# Patient Record
Sex: FEMALE | Race: BLACK OR AFRICAN AMERICAN | NOT HISPANIC OR LATINO | URBAN - NONMETROPOLITAN AREA
[De-identification: names, ages, dates, MRNs, and addresses within clinical notes are randomized per-mention and may not be internally consistent; named-entity substitution may affect disease eponyms.]

---

## 2017-10-18 ENCOUNTER — IMPORTED ENCOUNTER (OUTPATIENT)
Dept: URBAN - NONMETROPOLITAN AREA CLINIC 1 | Facility: CLINIC | Age: 32
End: 2017-10-18

## 2017-10-18 PROBLEM — H52.223: Noted: 2017-10-18

## 2017-10-18 PROBLEM — H52.13: Noted: 2017-10-18

## 2017-10-18 PROBLEM — H10.423: Noted: 2017-10-18

## 2017-10-18 PROBLEM — H40.023: Noted: 2017-10-18

## 2017-10-18 PROBLEM — H04.123: Noted: 2017-10-18

## 2017-10-18 PROCEDURE — 99204 OFFICE O/P NEW MOD 45 MIN: CPT

## 2017-10-18 PROCEDURE — 92015 DETERMINE REFRACTIVE STATE: CPT

## 2017-10-18 NOTE — PATIENT DISCUSSION
Glaucoma Suspect OU-  Discussed findings in detail w/ pt today-  Signs/symptoms associated discussed-  Family hx of glaucoma in grandmother and grandfather-  Large optic nerve heads noted OU but asymmetry noted. -  C/Ds noted at 0.8 OD and 0.7 OS. -  IOP today high normal at 21 OU. -  Would like to obtain baseline testing to include [facility] VF and OCT. -  RTC 3-4 weeks for recheck. HUGH/Ocular allergiesOU-  Discussed findings in detail w/ pt today-  Signs/symptoms associated discussed-  Recommended ATs OU BID PRN-  Start Pazeo OU QD sample and hand-written Rx given-  Monitor PRN Myopia/astig-  Discussed refractive status in detail w/ pt today-  MR done new GLRx given.  -  Monitor yearly or PRN.; 's Notes:   10/18/17DFKENDRA  10/18/17OptosOCT discOCT macVFGonioPACH

## 2017-11-14 ENCOUNTER — IMPORTED ENCOUNTER (OUTPATIENT)
Dept: URBAN - NONMETROPOLITAN AREA CLINIC 1 | Facility: CLINIC | Age: 32
End: 2017-11-14

## 2017-11-14 PROCEDURE — 92133 CPTRZD OPH DX IMG PST SGM ON: CPT

## 2017-11-14 PROCEDURE — 76514 ECHO EXAM OF EYE THICKNESS: CPT

## 2017-11-14 PROCEDURE — 99214 OFFICE O/P EST MOD 30 MIN: CPT

## 2017-11-14 NOTE — PATIENT DISCUSSION
Glaucoma Suspect OU-  Discussed findings in detail w/ pt today-  Signs/symptoms associated discussed-  Family hx of glaucoma in grandmother and grandfather-  Baseline OCT done today good RNFL noted OU. -  Baseline PACH done today:   and  (thick). -  Large optic nerve heads noted OU but asymmetry noted. -  C/Ds noted at 0.8 OD and 0.7 OS. -  IOP today was lower at 15 OU.-  No medication required at this time. -  Continue to monitor every 6 months. HUGH/Ocular allergiesOU-  Discussed findings in detail w/ pt today-  Signs/symptoms associated discussed-  Recommended ATs OU BID PRN-  Continue Pazeo OU QD sample and hand-written Rx given last visit.  -  Monitor PRN Myopia/astig-  Discussed refractive status in detail w/ pt last visit-  MR done new GLRx given last visit-  Monitor yearly or PRN.; 's Notes:   10/18/17DFE  10/18/17OptosOCT disc   11/14/17OCT macVFGonioPACH   11/14/17 Hiren Hernández

## 2018-05-15 ENCOUNTER — IMPORTED ENCOUNTER (OUTPATIENT)
Dept: URBAN - NONMETROPOLITAN AREA CLINIC 1 | Facility: CLINIC | Age: 33
End: 2018-05-15

## 2018-05-15 PROCEDURE — 99214 OFFICE O/P EST MOD 30 MIN: CPT

## 2018-05-15 PROCEDURE — 92020 GONIOSCOPY: CPT

## 2018-05-15 PROCEDURE — 92083 EXTENDED VISUAL FIELD XM: CPT

## 2018-05-15 NOTE — PATIENT DISCUSSION
Glaucoma Suspect OU-  Discussed findings in detail w/ pt today-  Signs/symptoms associated discussed-  Family hx of glaucoma in grandmother and grandfather-  Gisel Jonah done today unreliable OU:  OD shows inferior nasal step and scattered defects. OS shows scattered defects. -  Baseline Gonio done today grade IV with moderate pigment OU. -  Baseline OCT done last visit good RNFL noted OU. -  Baseline PACH done previously:   and  (thick). -  Large optic nerve heads noted OU but asymmetry noted. -  C/Ds noted at 0.8 OD and 0.7 OS. -  IOP today slightly higher at 17 OU previously was lower at 15 OU.-  No medication required at this time. -  Continue to monitor every 6 months or PRN.  HUGH/Ocular allergiesOU-  Discussed findings in detail w/ pt today-  Signs/symptoms associated discussed-  Continue ATs OU BID PRN-  Start Patanol BID OU PRN Rx called in to [facility] today-  Monitor PRN Myopia/astig-  Discussed refractive status in detail w/ pt last visit-  MR done new GLRx given last visit-  Monitor yearly or PRN.; 's Notes: MR  10/18/17DFE  10/18/17OptosOCT disc 11/14/17 (BL)OCT macVF 5/15/18 (BL)Gonio 5/15/18 (BL)PACH   11/14/17 Leyla Villegas

## 2020-01-03 ENCOUNTER — IMPORTED ENCOUNTER (OUTPATIENT)
Dept: URBAN - NONMETROPOLITAN AREA CLINIC 1 | Facility: CLINIC | Age: 35
End: 2020-01-03

## 2020-01-03 PROBLEM — H10.423: Noted: 2020-01-03

## 2020-01-03 PROBLEM — H40.023: Noted: 2020-01-03

## 2020-01-03 PROBLEM — H04.123: Noted: 2020-01-03

## 2020-01-03 PROBLEM — H52.223: Noted: 2020-01-03

## 2020-01-03 PROCEDURE — S0621 ROUTINE OPHTHALMOLOGICAL EXA: HCPCS

## 2020-01-03 NOTE — PATIENT DISCUSSION
Astigmatism OUDiscussed refractive status in detail with patient. No glasses needed at this time. Continue to monitor. Glaucoma Suspect OUDiscussed diagnosis in detail with patient. Signs/symptoms associated discussed. Family hx of glaucoma in grandmother and grandfather. IOP at 14 OD and 13 OS. Large optic nerve heads noted OU but asymmetry noted. C/Ds noted at 0.8 OD and 0.7 OS. No medication required at this time. Continue to monitor every 6 months or PRN. RTC in 6 months with VF 24-2 and OCT HUGH/Ocular allergiesOUDiscussed findings in detail w/ pt today. Signs/symptoms associated discussed. Continue ATs OU BID PRN. Continue Patanol BID OU PRN Rx sent to pharmacy. Continue to monitor PRN; 's Notes: MR  1/3/20DFE  10/18/17OptosOCT disc 11/14/17 (BL)OCT macVF 5/15/18 (BL)Gonio 5/15/18 (BL)PACH   11/14/17 Francesca Jacques

## 2020-07-24 ENCOUNTER — IMPORTED ENCOUNTER (OUTPATIENT)
Dept: URBAN - NONMETROPOLITAN AREA CLINIC 1 | Facility: CLINIC | Age: 35
End: 2020-07-24

## 2020-07-24 PROBLEM — H04.123: Noted: 2020-01-03

## 2020-07-24 PROBLEM — H40.1131: Noted: 2020-07-24

## 2020-07-24 PROBLEM — H10.423: Noted: 2020-01-03

## 2020-07-24 PROBLEM — H52.223: Noted: 2020-07-24

## 2020-07-24 PROCEDURE — 92133 CPTRZD OPH DX IMG PST SGM ON: CPT

## 2020-07-24 PROCEDURE — 92083 EXTENDED VISUAL FIELD XM: CPT

## 2020-07-24 PROCEDURE — 99214 OFFICE O/P EST MOD 30 MIN: CPT

## 2020-07-24 NOTE — PATIENT DISCUSSION
Mild POAG OUDiscussed diagnosis in detail with patient. Signs/symptoms associated discussed. Family hx of glaucoma in grandmother and grandfather. IOP at 14 OU. Large optic nerve heads noted OU but asymmetry noted. C/Ds noted at 0.8 OD and 0.7 OS. OCT done today; no NFL thinning OD and nasal NFL thinning OS. VF done today; reliable OU full no defects OD and non specific defect OS. Start Betimol QAM OU Rx sent to pharmacy. Continue to monitor every 6 months or PRN. RTC in 3 months with Optos Astigmatism OUDiscussed refractive status in detail with patient. No glasses needed at this time. Continue to monitor. HUGH/Ocular allergiesOUDiscussed findings in detail w/ pt today. Signs/symptoms associated discussed. Continue ATs OU BID PRN. Continue Patanol BID OU PRN. Continue to monitor PRN; 's Notes: MR  1/3/20DFE  10/18/17OptosOCT  7/24/20VF   7/24/20Gonio 5/15/18 [facility]   11/14/17 - Violet Schmidt

## 2020-11-02 ENCOUNTER — IMPORTED ENCOUNTER (OUTPATIENT)
Dept: URBAN - NONMETROPOLITAN AREA CLINIC 1 | Facility: CLINIC | Age: 35
End: 2020-11-02

## 2020-11-02 PROCEDURE — 99214 OFFICE O/P EST MOD 30 MIN: CPT

## 2020-11-02 PROCEDURE — 92250 FUNDUS PHOTOGRAPHY W/I&R: CPT

## 2020-11-02 NOTE — PATIENT DISCUSSION
Mild POAG OUDiscussed diagnosis in detail with patient. Signs/symptoms associated discussed. Family hx of glaucoma in grandmother and grandfather. Optos done today; baseline with glaucomatous cupping OU. IOP at 15 OD and 16 OS. Large optic nerve heads noted OU but asymmetry noted. C/Ds noted at 0.8 OD and 0.7 OS. Restart Cosopt BID OU Rx sent to pharmacy. Continue to monitor every 6 months or PRN. RTC in 3 months with Gonio Astigmatism OUDiscussed refractive status in detail with patient. No glasses needed at this time. Continue to monitor. HUGH/Ocular allergiesOUDiscussed findings in detail w/ pt today. Signs/symptoms associated discussed. Continue ATs OU BID PRN. Continue Patanol BID OU PRN. Continue to monitor PRN; 's Notes: MR  1/3/20DFE  10/18/17Optos  11/2/20OCT  7/24/20VF   7/24/20Gonio 5/15/18 PACH   11/14/17 - Angelina Byers

## 2021-06-29 ENCOUNTER — IMPORTED ENCOUNTER (OUTPATIENT)
Dept: URBAN - NONMETROPOLITAN AREA CLINIC 1 | Facility: CLINIC | Age: 36
End: 2021-06-29

## 2021-06-29 PROCEDURE — 99214 OFFICE O/P EST MOD 30 MIN: CPT

## 2021-06-29 PROCEDURE — 92020 GONIOSCOPY: CPT

## 2021-06-29 NOTE — PATIENT DISCUSSION
Mild POAG OUDiscussed diagnosis in detail with patient. Signs/symptoms associated discussed. Family hx of glaucoma in grandmother and grandfather. Optos done previously; baseline with glaucomatous cupping OU. Gonio done today grade IV with light pigment OUIOP stable at 15 OU Large optic nerve heads noted OU but asymmetry noted. C/Ds noted at 0.8 OD and 0.7 OS. Continue Cosopt BID OU Rx sent to pharmacy. Continue to monitor every 6 months or PRN. RTC in 3 months w/ OptosAstigmatism OUDiscussed refractive status in detail with patient. No glasses needed at this time. Continue to monitor. HUGH/Ocular allergiesOUDiscussed findings in detail w/ pt today. Signs/symptoms associated discussed. Continue ATs OU BID PRN. Continue Patanol BID OU PRN. Continue to monitor PRN Patient is extremely light sensitive recommended anti-glare OTC glasses to see if this will help.; 's Notes: MR  1/3/20DFE  10/18/17Optos  11/2/20OCT  7/24/20VF   7/24/20Gonio 6/29/2021PACH   11/14/17 - Marlee Wilkinson

## 2021-10-04 ENCOUNTER — IMPORTED ENCOUNTER (OUTPATIENT)
Dept: URBAN - NONMETROPOLITAN AREA CLINIC 1 | Facility: CLINIC | Age: 36
End: 2021-10-04

## 2021-10-04 PROCEDURE — 99214 OFFICE O/P EST MOD 30 MIN: CPT

## 2021-10-04 PROCEDURE — 92250 FUNDUS PHOTOGRAPHY W/I&R: CPT
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
## 2022-02-12 ASSESSMENT — VISUAL ACUITY
OD_CC: 20/20
OD_CC: 20/20-
OD_SC: J1+
OS_CC: 20/20
OS_CC: 20/20-
OS_CC: 20/20-
OD_CC: 20/20
OS_SC: J1+

## 2022-02-12 ASSESSMENT — TONOMETRY
OS_IOP_MMHG: 21
OD_IOP_MMHG: 21
OS_IOP_MMHG: 17
OD_IOP_MMHG: 17
OS_IOP_MMHG: 15
OD_IOP_MMHG: 15

## 2022-02-12 ASSESSMENT — PACHYMETRY
OS_CT_UM: 627; ADJ: THICK
OD_CT_UM: 620; ADJ: THICK

## 2022-04-09 ASSESSMENT — PACHYMETRY
OD_CT_UM: 620; ADJ: THICK
OS_CT_UM: 627; ADJ: THICK
OD_CT_UM: 620; ADJ: THICK
OD_CT_UM: 620; ADJ: THICK
OS_CT_UM: 627; ADJ: THICK
OS_CT_UM: 627; ADJ: THICK
OD_CT_UM: 620; ADJ: THICK
OS_CT_UM: 627; ADJ: THICK
OS_CT_UM: 627; ADJ: THICK
OD_CT_UM: 620; ADJ: THICK

## 2022-04-09 ASSESSMENT — TONOMETRY
OD_IOP_MMHG: 16
OS_IOP_MMHG: 16
OS_IOP_MMHG: 15
OS_IOP_MMHG: 14
OD_IOP_MMHG: 14
OD_IOP_MMHG: 15
OD_IOP_MMHG: 14
OD_IOP_MMHG: 15
OS_IOP_MMHG: 13
OS_IOP_MMHG: 16

## 2022-04-09 ASSESSMENT — VISUAL ACUITY
OD_CC: 20/20
OS_CC: 20/20
OD_SC: 20/20
OS_CC: 20/20
OD_CC: 20/25-2
OS_SC: 20/20
OS_CC: 20/20
OD_CC: 20/20
OS_CC: 20/20-1
OD_CC: 20/20